# Patient Record
Sex: FEMALE | Race: ASIAN | NOT HISPANIC OR LATINO
[De-identification: names, ages, dates, MRNs, and addresses within clinical notes are randomized per-mention and may not be internally consistent; named-entity substitution may affect disease eponyms.]

---

## 2020-11-22 PROBLEM — Z00.00 ENCOUNTER FOR PREVENTIVE HEALTH EXAMINATION: Status: ACTIVE | Noted: 2020-11-22

## 2020-11-23 ENCOUNTER — NON-APPOINTMENT (OUTPATIENT)
Age: 80
End: 2020-11-23

## 2020-11-23 ENCOUNTER — APPOINTMENT (OUTPATIENT)
Dept: CARDIOLOGY | Facility: CLINIC | Age: 80
End: 2020-11-23
Payer: MEDICARE

## 2020-11-23 VITALS
BODY MASS INDEX: 19.76 KG/M2 | RESPIRATION RATE: 17 BRPM | WEIGHT: 98 LBS | SYSTOLIC BLOOD PRESSURE: 135 MMHG | HEART RATE: 87 BPM | HEIGHT: 59 IN | OXYGEN SATURATION: 97 % | DIASTOLIC BLOOD PRESSURE: 79 MMHG | TEMPERATURE: 98 F

## 2020-11-23 DIAGNOSIS — E03.9 HYPOTHYROIDISM, UNSPECIFIED: ICD-10-CM

## 2020-11-23 PROCEDURE — 99204 OFFICE O/P NEW MOD 45 MIN: CPT

## 2020-11-23 PROCEDURE — 93306 TTE W/DOPPLER COMPLETE: CPT

## 2020-11-23 PROCEDURE — 93000 ELECTROCARDIOGRAM COMPLETE: CPT

## 2020-12-09 ENCOUNTER — FORM ENCOUNTER (OUTPATIENT)
Age: 80
End: 2020-12-09

## 2020-12-22 PROBLEM — E03.9 HYPOTHYROIDISM: Status: ACTIVE | Noted: 2020-12-22

## 2020-12-22 RX ORDER — LEVOTHYROXINE SODIUM 0.05 MG/1
50 TABLET ORAL
Refills: 0 | Status: ACTIVE | COMMUNITY

## 2020-12-22 RX ORDER — LOSARTAN POTASSIUM 100 MG/1
100 TABLET, FILM COATED ORAL
Refills: 0 | Status: ACTIVE | COMMUNITY

## 2020-12-22 RX ORDER — RALOXIFENE HYDROCHLORIDE 60 MG/1
60 TABLET ORAL
Refills: 0 | Status: ACTIVE | COMMUNITY

## 2020-12-22 NOTE — HISTORY OF PRESENT ILLNESS
[FreeTextEntry1] : 80 year-old female with HTN, hypothyroid presents for evaluation of palpitations. Patient reports that on 11/14/20 she was about to undergo EGD.  Her HR went up to 130 so her procedure was cancelled. Patient reports that she wasn't anxious at the time because she has had several EGD's with the same doctors in the past for polyps.  Patient denies CP or SOB at baseline.  Patient reports occasional palpitations since 2 years ago, lasting 1 minute. Patient reports that when she gets upset, she would get palpitations and her BP would become elevated.  I advised patient to undergo an echocardiogram. I advised patient to wear a 7-day event monitor.

## 2020-12-22 NOTE — DISCUSSION/SUMMARY
[FreeTextEntry1] : 80 year-old female with HTN, hypothyroid presents for evaluation of palpitations. Patient reports that on 11/14/20 she was about to undergo EGD.  Her HR went up to 130 so her procedure was cancelled. Patient reports that she wasn't anxious at the time because she has had several EGD's with the same doctors in the past for polyps.  Patient denies CP or SOB at baseline.  Patient reports occasional palpitations since 2 years ago, lasting 1 minute. Patient reports that when she gets upset, she would get palpitations and her BP would become elevated.  I advised patient to undergo an echocardiogram. I advised patient to wear a 7-day event monitor.\par \par (1) Palpitations - Patient underwent an echocardiogram and it showed normal LV function with moderate AR.  Patient wore a 7-day Ziopatch and it showed numerous short runs of PAT (169 beats the longest).  I advised patient to start Metoprolol ER 25 mg and followup in 1 month. \par \par (2) AR, moderate - Patient was noted to have moderate AR on echo.  Patient should have a followup echo in 1 year. \par \par (3) Followup - 1 month.

## 2020-12-22 NOTE — PHYSICAL EXAM
[General Appearance - Well Developed] : well developed [Normal Appearance] : normal appearance [Well Groomed] : well groomed [General Appearance - Well Nourished] : well nourished [No Deformities] : no deformities [General Appearance - In No Acute Distress] : no acute distress [Normal Conjunctiva] : the conjunctiva exhibited no abnormalities [Eyelids - No Xanthelasma] : the eyelids demonstrated no xanthelasmas [Normal Oral Mucosa] : normal oral mucosa [No Oral Pallor] : no oral pallor [No Oral Cyanosis] : no oral cyanosis [Normal Jugular Venous A Waves Present] : normal jugular venous A waves present [Normal Jugular Venous V Waves Present] : normal jugular venous V waves present [No Jugular Venous Espino A Waves] : no jugular venous espino A waves [Heart Rate And Rhythm] : heart rate and rhythm were normal [Heart Sounds] : normal S1 and S2 [Murmurs] : no murmurs present [Respiration, Rhythm And Depth] : normal respiratory rhythm and effort [Exaggerated Use Of Accessory Muscles For Inspiration] : no accessory muscle use [Auscultation Breath Sounds / Voice Sounds] : lungs were clear to auscultation bilaterally [Abdomen Soft] : soft [Abdomen Tenderness] : non-tender [Abdomen Mass (___ Cm)] : no abdominal mass palpated [Abnormal Walk] : normal gait [Gait - Sufficient For Exercise Testing] : the gait was sufficient for exercise testing [Nail Clubbing] : no clubbing of the fingernails [Cyanosis, Localized] : no localized cyanosis [Petechial Hemorrhages (___cm)] : no petechial hemorrhages [] : no ischemic changes [Oriented To Time, Place, And Person] : oriented to person, place, and time [Affect] : the affect was normal [Mood] : the mood was normal [No Anxiety] : not feeling anxious [Arterial Pulses Normal] : the arterial pulses were normal [Edema] : no peripheral edema present

## 2022-01-25 PROBLEM — R00.2 PALPITATIONS: Status: ACTIVE | Noted: 2020-11-23

## 2022-01-25 NOTE — PHYSICAL EXAM
[General Appearance - Well Developed] : well developed [Normal Appearance] : normal appearance [Well Groomed] : well groomed [General Appearance - Well Nourished] : well nourished [No Deformities] : no deformities [General Appearance - In No Acute Distress] : no acute distress [Normal Conjunctiva] : the conjunctiva exhibited no abnormalities [Eyelids - No Xanthelasma] : the eyelids demonstrated no xanthelasmas [Normal Oral Mucosa] : normal oral mucosa [No Oral Pallor] : no oral pallor [No Oral Cyanosis] : no oral cyanosis [Normal Jugular Venous A Waves Present] : normal jugular venous A waves present [Normal Jugular Venous V Waves Present] : normal jugular venous V waves present [No Jugular Venous Espino A Waves] : no jugular venous espino A waves [Heart Rate And Rhythm] : heart rate and rhythm were normal [Heart Sounds] : normal S1 and S2 [Murmurs] : no murmurs present [Arterial Pulses Normal] : the arterial pulses were normal [Edema] : no peripheral edema present [Respiration, Rhythm And Depth] : normal respiratory rhythm and effort [Exaggerated Use Of Accessory Muscles For Inspiration] : no accessory muscle use [Auscultation Breath Sounds / Voice Sounds] : lungs were clear to auscultation bilaterally [Abdomen Soft] : soft [Abdomen Tenderness] : non-tender [Abdomen Mass (___ Cm)] : no abdominal mass palpated [Abnormal Walk] : normal gait [Gait - Sufficient For Exercise Testing] : the gait was sufficient for exercise testing [Nail Clubbing] : no clubbing of the fingernails [Cyanosis, Localized] : no localized cyanosis [Petechial Hemorrhages (___cm)] : no petechial hemorrhages [] : no ischemic changes [Oriented To Time, Place, And Person] : oriented to person, place, and time [Affect] : the affect was normal [Mood] : the mood was normal [No Anxiety] : not feeling anxious

## 2022-01-26 ENCOUNTER — NON-APPOINTMENT (OUTPATIENT)
Age: 82
End: 2022-01-26

## 2022-01-26 ENCOUNTER — APPOINTMENT (OUTPATIENT)
Dept: CARDIOLOGY | Facility: CLINIC | Age: 82
End: 2022-01-26
Payer: MEDICARE

## 2022-01-26 VITALS
RESPIRATION RATE: 17 BRPM | SYSTOLIC BLOOD PRESSURE: 148 MMHG | HEART RATE: 89 BPM | WEIGHT: 90 LBS | TEMPERATURE: 97.6 F | OXYGEN SATURATION: 94 % | BODY MASS INDEX: 18.18 KG/M2 | DIASTOLIC BLOOD PRESSURE: 77 MMHG

## 2022-01-26 DIAGNOSIS — R00.2 PALPITATIONS: ICD-10-CM

## 2022-01-26 DIAGNOSIS — R07.1 CHEST PAIN ON BREATHING: ICD-10-CM

## 2022-01-26 DIAGNOSIS — Z01.810 ENCOUNTER FOR PREPROCEDURAL CARDIOVASCULAR EXAMINATION: ICD-10-CM

## 2022-01-26 PROCEDURE — ZZZZZ: CPT

## 2022-01-26 PROCEDURE — 93015 CV STRESS TEST SUPVJ I&R: CPT

## 2022-01-26 PROCEDURE — 93000 ELECTROCARDIOGRAM COMPLETE: CPT | Mod: 59

## 2022-01-26 PROCEDURE — 99214 OFFICE O/P EST MOD 30 MIN: CPT | Mod: 25

## 2022-01-26 PROCEDURE — 93306 TTE W/DOPPLER COMPLETE: CPT

## 2022-01-26 NOTE — REASON FOR VISIT
[FreeTextEntry1] : 80 year-old female with PAT, HTN, hypothyroidism presents for followup.  \par \par Patient was last seen on 11/23/20 evaluation of palpitations.  Patient underwent an echocardiogram and it showed normal LV function with moderate AR.  Patient wore a 7-day Ziopatch and it showed numerous short runs of PAT (169 beats the longest).  I advised patient to start Metoprolol ER 25 mg.  I advised patient to have a followup echo in 1 year to assess AR.

## 2022-01-26 NOTE — HISTORY OF PRESENT ILLNESS
[FreeTextEntry1] : 1/26/22 - Patient needs cardiac clearance prior to brain surgery.  Patient reports SSCP at night, not related to exertion, worse with food.  Her EGD in 2020 was cancelled due to tachycardia.  Patient reports FERNANDES.  Patient denies palpitations.  She is off Metoprolol ER 25 mg because she ran out.  She is on Losartan 100 mg and Amlodipine 5 mg for HTN.  I advised patient to undergo an echocardiogram and a treadmill stress test. \par \par 11/23/20  - Patient reports that on 11/14/20 she was about to undergo EGD.  Her HR went up to 130 so her procedure was cancelled. Patient reports that she wasn't anxious at the time because she has had several EGD's with the same doctors in the past for polyps.  Patient denies CP or SOB at baseline.  Patient reports occasional palpitations since 2 years ago, lasting 1 minute. Patient reports that when she gets upset, she would get palpitations and her BP would become elevated.  I advised patient to undergo an echocardiogram. I advised patient to wear a 7-day event monitor.

## 2022-07-29 ENCOUNTER — APPOINTMENT (OUTPATIENT)
Dept: CARDIOLOGY | Facility: CLINIC | Age: 82
End: 2022-07-29

## 2022-07-29 VITALS
DIASTOLIC BLOOD PRESSURE: 60 MMHG | WEIGHT: 93 LBS | SYSTOLIC BLOOD PRESSURE: 131 MMHG | BODY MASS INDEX: 18.78 KG/M2 | TEMPERATURE: 97.6 F | OXYGEN SATURATION: 96 % | HEART RATE: 65 BPM | RESPIRATION RATE: 18 BRPM

## 2022-07-29 DIAGNOSIS — R07.9 CHEST PAIN, UNSPECIFIED: ICD-10-CM

## 2022-07-29 PROCEDURE — 99214 OFFICE O/P EST MOD 30 MIN: CPT

## 2022-07-29 NOTE — REASON FOR VISIT
[FreeTextEntry1] : 82 year-old female with PAT, HTN, hypothyroidism presents for followup.  \par \par Patient was last seen on 1/26/22 for cardiac clearance prior to brain surgery.  Patient reported SSCP at night.  I advised patient to undergo an echocardiogram and a treadmill stress test.  Patient underwent an echocardiogram and it showed normal LV function with mild AR. Patient underwent a treadmill stress test and completed 4.5 minutes of Jose protocol.  There were upsloping ST depressions on ECG but no symptoms.  Following treadmill stress, there was no echocardiographic evidence of ischemia.  Oxygen saturation dropped to 88% at peak stress.\par \par She is on Losartan 100 mg and Amlodipine 5 mg for HTN.

## 2022-08-22 PROBLEM — R07.9 CHEST PAIN: Status: ACTIVE | Noted: 2022-01-26

## 2023-01-27 ENCOUNTER — NON-APPOINTMENT (OUTPATIENT)
Age: 83
End: 2023-01-27

## 2023-01-27 ENCOUNTER — APPOINTMENT (OUTPATIENT)
Dept: CARDIOLOGY | Facility: CLINIC | Age: 83
End: 2023-01-27
Payer: MEDICARE

## 2023-01-27 VITALS
SYSTOLIC BLOOD PRESSURE: 117 MMHG | OXYGEN SATURATION: 96 % | HEART RATE: 72 BPM | TEMPERATURE: 97.6 F | DIASTOLIC BLOOD PRESSURE: 68 MMHG | RESPIRATION RATE: 18 BRPM | WEIGHT: 92 LBS | BODY MASS INDEX: 18.58 KG/M2

## 2023-01-27 DIAGNOSIS — R06.02 SHORTNESS OF BREATH: ICD-10-CM

## 2023-01-27 PROCEDURE — 93000 ELECTROCARDIOGRAM COMPLETE: CPT

## 2023-01-27 PROCEDURE — 99214 OFFICE O/P EST MOD 30 MIN: CPT

## 2023-01-27 RX ORDER — AMLODIPINE BESYLATE 5 MG/1
5 TABLET ORAL
Refills: 0 | Status: ACTIVE | COMMUNITY

## 2023-01-27 NOTE — HISTORY OF PRESENT ILLNESS
[FreeTextEntry1] : \par 7/29/22 - Patient reports localized left upper CP not related to exertion.  Patient denies SOB.  Patient denies palpitations.  Patient denies lightheadedness.  Patient denies h/o syncope.  She is on Metoprolol ER 25 mg, Losartan 100 mg and Amlodipine 5 mg for HTN.   She needs refills for Metoprolol ER 25 mg.  FU 6 months. \par \par 1/26/22 - Patient needs cardiac clearance prior to brain surgery.  Patient reports SSCP at night, not related to exertion, worse with food.  Her EGD in 2020 was cancelled due to tachycardia.  Patient reports FERNANDES.  Patient denies palpitations.  She is off Metoprolol ER 25 mg because she ran out.  She is on Losartan 100 mg and Amlodipine 5 mg for HTN.  I advised patient to undergo an echocardiogram and a treadmill stress test.  Patient underwent an echocardiogram and it showed normal LV function without significant valvular pathology.  Patient underwent a treadmill stress test and completed 4.5 minutes of Jose protocol.  There was no ECG evidence of ischemia.  Following treadmill stress, there was no echocardiographic evidence of ischemia.  Oxygen saturation dropped to 88% at peak exercise.\par \par 11/23/20  - Patient reports that on 11/14/20 she was about to undergo EGD.  Her HR went up to 130 so her procedure was cancelled. Patient reports that she wasn't anxious at the time because she has had several EGD's with the same doctors in the past for polyps.  Patient denies CP or SOB at baseline.  Patient reports occasional palpitations since 2 years ago, lasting 1 minute. Patient reports that when she gets upset, she would get palpitations and her BP would become elevated.  I advised patient to undergo an echocardiogram. I advised patient to wear a 7-day event monitor.

## 2023-01-27 NOTE — REASON FOR VISIT
[FreeTextEntry1] : 82 year-old female with PAT, HTN, hypothyroidism, presents for followup.  \par \par Patient was last seen on 7/29/22 for followup.  Patient reported localized left upper CP not related to exertion.  She needed refills for Metoprolol ER 25 mg.   \par \par She is on Metoprolol ER 25 mg, Losartan 100 mg, Amlodipine 5 mg for HTN. \par \par Patient underwent an echocardiogram 1/26/22 and it showed normal LV function without significant valvular pathology.  \par \par Patient underwent a treadmill stress test  1/26/22 and completed 4.5 minutes of Jose protocol.  There was no ECG evidence of ischemia.  Following treadmill stress, there was no echocardiographic evidence of ischemia.  Oxygen saturation dropped to 88% at peak exercise.

## 2023-01-29 PROBLEM — R06.02 SOB (SHORTNESS OF BREATH): Status: ACTIVE | Noted: 2023-01-29

## 2024-01-31 PROBLEM — I10 HTN (HYPERTENSION): Status: ACTIVE | Noted: 2020-11-26

## 2024-01-31 PROBLEM — I47.19 PAT (PAROXYSMAL ATRIAL TACHYCARDIA): Status: ACTIVE | Noted: 2022-01-25

## 2024-02-02 ENCOUNTER — NON-APPOINTMENT (OUTPATIENT)
Age: 84
End: 2024-02-02

## 2024-02-02 ENCOUNTER — APPOINTMENT (OUTPATIENT)
Dept: CARDIOLOGY | Facility: CLINIC | Age: 84
End: 2024-02-02
Payer: MEDICARE

## 2024-02-02 VITALS
OXYGEN SATURATION: 99 % | HEART RATE: 66 BPM | SYSTOLIC BLOOD PRESSURE: 136 MMHG | RESPIRATION RATE: 16 BRPM | DIASTOLIC BLOOD PRESSURE: 67 MMHG | TEMPERATURE: 96.8 F | BODY MASS INDEX: 17.98 KG/M2 | WEIGHT: 89 LBS

## 2024-02-02 DIAGNOSIS — I10 ESSENTIAL (PRIMARY) HYPERTENSION: ICD-10-CM

## 2024-02-02 DIAGNOSIS — I47.19 OTHER SUPRAVENTRICULAR TACHYCARDIA: ICD-10-CM

## 2024-02-02 PROCEDURE — 99214 OFFICE O/P EST MOD 30 MIN: CPT

## 2024-02-02 RX ORDER — METOPROLOL SUCCINATE 25 MG/1
25 TABLET, EXTENDED RELEASE ORAL DAILY
Qty: 90 | Refills: 1 | Status: ACTIVE | COMMUNITY
Start: 2020-12-11 | End: 1900-01-01

## 2024-02-03 NOTE — REASON FOR VISIT
[FreeTextEntry1] : 83 year-old female with PAT, HTN, hypothyroidism, presents for followup.    Patient was last seen on 1/27/23 - Patient returns for followup.  Patient denies CP.  Patient denies palpitations.  Patient reports increased FERNANDES.  10/2022 CXR unremarkable.  /68 HR 72.  Patient has trace LE edema.  ECG showed no ischemic changes.  She is on Metoprolol ER 25 mg, Losartan 100 mg, Amlodipine 5 mg for HTN.   I advised patient to continue current medications.  FU 6 months.      She is on Metoprolol ER 25 mg, Losartan 100 mg, Amlodipine 5 mg for HTN.   Patient underwent an echocardiogram 1/26/22 and it showed normal LV function without significant valvular pathology.    Patient underwent a treadmill stress test  1/26/22 and completed 4.5 minutes of Jose protocol.  There was no ECG evidence of ischemia.  Following treadmill stress, there was no echocardiographic evidence of ischemia.  Oxygen saturation dropped to 88% at peak exercise.

## 2024-02-03 NOTE — HISTORY OF PRESENT ILLNESS
[FreeTextEntry1] : 2/2/24 - Patient reports localized SSCP that is not tender and not related to exertion. Patient denies SOB. Patient denies palpitations. Patient denies lightheadedness. Patient reports having CXR last year that was negative. I advised patient to get nuclear stress test if symptoms worsen.   1/27/23 - Patient returns for followup.  Patient denies CP.  Patient denies palpitations.  Patient reports increased FERNANDES.  10/2022 CXR unremarkable.  /68 HR 72.  Patient has trace LE edema.  ECG showed no ischemic changes.  She is on Metoprolol ER 25 mg, Losartan 100 mg, Amlodipine 5 mg for HTN.   I advised patient to continue current medications.  FU 6 months.    7/29/22 - Patient reports localized left upper CP not related to exertion.  Patient denies SOB.  Patient denies palpitations.  Patient denies lightheadedness.  Patient denies h/o syncope.  She is on Metoprolol ER 25 mg, Losartan 100 mg and Amlodipine 5 mg for HTN.   She needs refills for Metoprolol ER 25 mg.  FU 6 months.   1/26/22 - Patient needs cardiac clearance prior to brain surgery.  Patient reports SSCP at night, not related to exertion, worse with food.  Her EGD in 2020 was cancelled due to tachycardia.  Patient reports FERNANDES.  Patient denies palpitations.  She is off Metoprolol ER 25 mg because she ran out.  She is on Losartan 100 mg and Amlodipine 5 mg for HTN.  I advised patient to undergo an echocardiogram and a treadmill stress test.  Patient underwent an echocardiogram and it showed normal LV function without significant valvular pathology.  Patient underwent a treadmill stress test and completed 4.5 minutes of Jose protocol.  There was no ECG evidence of ischemia.  Following treadmill stress, there was no echocardiographic evidence of ischemia.  Oxygen saturation dropped to 88% at peak exercise.  11/23/20  - Patient reports that on 11/14/20 she was about to undergo EGD.  Her HR went up to 130 so her procedure was cancelled. Patient reports that she wasn't anxious at the time because she has had several EGD's with the same doctors in the past for polyps.  Patient denies CP or SOB at baseline.  Patient reports occasional palpitations since 2 years ago, lasting 1 minute. Patient reports that when she gets upset, she would get palpitations and her BP would become elevated.  I advised patient to undergo an echocardiogram. I advised patient to wear a 7-day event monitor.

## 2024-10-29 ENCOUNTER — RX RENEWAL (OUTPATIENT)
Age: 84
End: 2024-10-29

## 2025-01-29 ENCOUNTER — APPOINTMENT (OUTPATIENT)
Dept: CARDIOLOGY | Facility: CLINIC | Age: 85
End: 2025-01-29
Payer: MEDICARE

## 2025-01-29 ENCOUNTER — NON-APPOINTMENT (OUTPATIENT)
Age: 85
End: 2025-01-29

## 2025-01-29 VITALS
HEART RATE: 72 BPM | SYSTOLIC BLOOD PRESSURE: 151 MMHG | WEIGHT: 93 LBS | DIASTOLIC BLOOD PRESSURE: 72 MMHG | BODY MASS INDEX: 18.78 KG/M2 | RESPIRATION RATE: 18 BRPM | OXYGEN SATURATION: 98 %

## 2025-01-29 DIAGNOSIS — R06.02 SHORTNESS OF BREATH: ICD-10-CM

## 2025-01-29 DIAGNOSIS — Z29.89 ENCOUNTER. FOR OTHER SPECIFIED PROPHYLACTIC MEASURES: ICD-10-CM

## 2025-01-29 DIAGNOSIS — I10 ESSENTIAL (PRIMARY) HYPERTENSION: ICD-10-CM

## 2025-01-29 DIAGNOSIS — I47.19 OTHER SUPRAVENTRICULAR TACHYCARDIA: ICD-10-CM

## 2025-01-29 PROCEDURE — 93000 ELECTROCARDIOGRAM COMPLETE: CPT

## 2025-01-29 PROCEDURE — 93306 TTE W/DOPPLER COMPLETE: CPT

## 2025-01-29 PROCEDURE — 99214 OFFICE O/P EST MOD 30 MIN: CPT

## 2025-01-29 RX ORDER — ACETAZOLAMIDE 125 MG/1
125 TABLET ORAL TWICE DAILY
Qty: 14 | Refills: 0 | Status: ACTIVE | COMMUNITY
Start: 2025-01-29 | End: 1900-01-01